# Patient Record
Sex: FEMALE
[De-identification: names, ages, dates, MRNs, and addresses within clinical notes are randomized per-mention and may not be internally consistent; named-entity substitution may affect disease eponyms.]

---

## 2019-08-08 ENCOUNTER — HOSPITAL ENCOUNTER (OUTPATIENT)
Dept: HOSPITAL 88 - MRI | Age: 74
End: 2019-08-08
Attending: FAMILY MEDICINE
Payer: MEDICARE

## 2019-08-08 DIAGNOSIS — M54.42: Primary | ICD-10-CM

## 2019-08-08 PROCEDURE — 72148 MRI LUMBAR SPINE W/O DYE: CPT

## 2019-08-08 NOTE — DIAGNOSTIC IMAGING REPORT
Exam: Lumbar spine MRI without IV contrast

History: Low back pain with sciatica. Left leg pain with burning sensation and

weakness.

Comparison studies: None



Technique: 

Sagittal and axial T2 , sagittal T1 and IR, axial  spin density oblique.

Intravenous contrast: None



Findings:



Number of lumbar vertebral bodies: 5.

Grade 1 approximate 7 mm degenerative anterolisthesis of L4 on L5. S-shaped

thoracolumbar scoliosis with mild lumbar curvature convex to the right.



Alignment: Normal lordosis. No scoliosis.



Soft tissues: No T2 hyperintense inflammatory changes. Incidental partially and

age exophytic T2 hyperintense 5.0 cm left inferior pole renal lesion is most

likely a cyst.

Paraspinal muscles: Mild symmetric fatty replaced dorsal paraspinal muscular

atrophy.



Lower thoracic cord: Normal in signal and morphology. The tip of the conus is

at L1-2.

Cauda equina: No masses or arachnoiditis. Focal crowding of the cauda equina

nerve roots at L4-L5 due to severe degenerative canal stenosis as described

below. Cauda equina nerve roots at L1-L2 are displaced by large disc herniation

as described below.



Vertebrae: 

No compression fractures, infection or neoplasm.



Degenerative changes:



T10-T11 through T12-L1:

Mildly degenerated disc with loss of disc height loss of T2 disc signal. Patent

canal and foramina.



L1-L2:

Mildly degenerated disc with loss of disc height loss of T2 disc signal. Disc

bulge with superimposed 15 mm x 9 mm x 14 mm (SI x AP x TV) inferiorly migrated

disc extrusion, thickened ligamentum flavum and mild facet arthrosis with

moderate canal stenosis and mild bilateral foraminal stenosis.



L2-L3: 

Mildly degenerated disc with loss of T2 disc signal. Small disc bulge and mild

facet arthrosis with mild bilateral foraminal stenosis. Patent canal.



L3-L4:

Mildly degenerated disc with loss of disc height loss of T2 disc signal.

Minimal anterolisthesis of L3 on L4 with associated uncovered disc/disc bulge

asymmetric to the left, thickened ligamenta flava and bilateral facet arthrosis

with moderate left foraminal stenosis, mild right foraminal stenosis and mild

canal stenosis



L4-L5:

Mildly degenerated disc with loss of disc height loss of T2 disc signal. Mild

edema present along the superior L5 endplate on the right. Grade 1

anterolisthesis of L4 and L5 with associated uncovered disc/disc bulge

asymmetric to the left, thickened ligamentum flavum and severe bilateral facet

arthrosis with severe canal stenosis, narrowing of the subarticular recesses

and severe left foraminal stenosis with impingement on the left L4 nerve root.

Patent right foramen.



L5-S1:

Moderately degenerated disc with loss of disc height loss of T2 disc signal

with associated degenerative endplate changes, greater on the right with

endplate edema. Minimal retrolisthesis of L5 on S1 with associated uncovered

disc/small central disc extrusion, thickened ligamentum flavum and bilateral

facet arthrosis with moderate canal stenosis, narrowing of the subarticular

recesses with disc which disc abuts and may impinge on the right S1 nerve root

and moderate right and mild left foraminal stenosis. Canal stenosis is

accentuated by epidural fat at this level.



IMPRESSION: 



1.  Multilevel disc degeneration, worse/moderate L5-S1 where there are

degenerative endplate changes and endplate edema. 

2.  Moderate degenerative canal stenosis with large central disc extrusion at

L1-L2. 

3.  Degenerative Grade 1 anterolisthesis of L4 on L5 with severe degenerative

canal stenosis at L4-L5.

4.  Moderate canal stenosis with impingement on the right S1 nerve root at

L5-S1.

5.  Multilevel degenerative foraminal stenosis, moderate left at L3-L4, severe

left at L4-L5 and moderate right at L5-S1.



Signed by: Dr. Richard Luis M.D. on 8/8/2019 12:58 PM

## 2019-09-27 ENCOUNTER — HOSPITAL ENCOUNTER (OUTPATIENT)
Dept: HOSPITAL 88 - PT | Age: 74
LOS: 3 days | End: 2019-09-30
Attending: NEUROLOGICAL SURGERY
Payer: MEDICARE

## 2019-09-27 DIAGNOSIS — M62.81: ICD-10-CM

## 2019-09-27 DIAGNOSIS — M51.16: Primary | ICD-10-CM

## 2019-09-27 PROCEDURE — 97032 APPL MODALITY 1+ESTIM EA 15: CPT

## 2019-09-27 PROCEDURE — 97110 THERAPEUTIC EXERCISES: CPT

## 2019-09-27 PROCEDURE — 97162 PT EVAL MOD COMPLEX 30 MIN: CPT

## 2019-10-30 ENCOUNTER — HOSPITAL ENCOUNTER (OUTPATIENT)
Dept: HOSPITAL 88 - PT | Age: 74
LOS: 1 days | End: 2019-10-31
Attending: NEUROLOGICAL SURGERY
Payer: MEDICARE

## 2019-10-30 DIAGNOSIS — M51.16: Primary | ICD-10-CM

## 2019-10-30 PROCEDURE — 97112 NEUROMUSCULAR REEDUCATION: CPT

## 2019-10-30 PROCEDURE — 97110 THERAPEUTIC EXERCISES: CPT

## 2019-11-01 ENCOUNTER — HOSPITAL ENCOUNTER (OUTPATIENT)
Dept: HOSPITAL 88 - PT | Age: 74
LOS: 29 days | End: 2019-11-30
Attending: NEUROLOGICAL SURGERY
Payer: MEDICARE

## 2019-11-01 DIAGNOSIS — M51.16: Primary | ICD-10-CM
